# Patient Record
Sex: MALE | Race: WHITE | NOT HISPANIC OR LATINO | URBAN - METROPOLITAN AREA
[De-identification: names, ages, dates, MRNs, and addresses within clinical notes are randomized per-mention and may not be internally consistent; named-entity substitution may affect disease eponyms.]

---

## 2023-12-21 ENCOUNTER — OFFICE VISIT (OUTPATIENT)
Dept: PAIN MEDICINE | Facility: CLINIC | Age: 58
End: 2023-12-21
Payer: COMMERCIAL

## 2023-12-21 VITALS
SYSTOLIC BLOOD PRESSURE: 195 MMHG | TEMPERATURE: 97.3 F | HEART RATE: 78 BPM | DIASTOLIC BLOOD PRESSURE: 89 MMHG | WEIGHT: 183 LBS | RESPIRATION RATE: 18 BRPM | BODY MASS INDEX: 30.49 KG/M2 | HEIGHT: 65 IN

## 2023-12-21 DIAGNOSIS — M79.642 PAIN OF LEFT HAND: Primary | ICD-10-CM

## 2023-12-21 DIAGNOSIS — I10 PRIMARY HYPERTENSION: ICD-10-CM

## 2023-12-21 PROCEDURE — 99203 OFFICE O/P NEW LOW 30 MIN: CPT | Performed by: ANESTHESIOLOGY

## 2023-12-21 PROCEDURE — 3079F DIAST BP 80-89 MM HG: CPT | Performed by: ANESTHESIOLOGY

## 2023-12-21 PROCEDURE — 99213 OFFICE O/P EST LOW 20 MIN: CPT | Performed by: ANESTHESIOLOGY

## 2023-12-21 PROCEDURE — 3077F SYST BP >= 140 MM HG: CPT | Performed by: ANESTHESIOLOGY

## 2023-12-21 RX ORDER — TOPIRAMATE 25 MG/1
TABLET ORAL
Qty: 141 TABLET | Refills: 0 | Status: SHIPPED | OUTPATIENT
Start: 2023-12-21 | End: 2024-02-03

## 2023-12-21 RX ORDER — DULOXETIN HYDROCHLORIDE 30 MG/1
60 CAPSULE, DELAYED RELEASE ORAL DAILY
COMMUNITY

## 2023-12-21 RX ORDER — LISINOPRIL 5 MG/1
5 TABLET ORAL DAILY
Qty: 30 TABLET | Refills: 0 | Status: SHIPPED | OUTPATIENT
Start: 2023-12-21 | End: 2024-01-20

## 2023-12-21 ASSESSMENT — COLUMBIA-SUICIDE SEVERITY RATING SCALE - C-SSRS
6. HAVE YOU EVER DONE ANYTHING, STARTED TO DO ANYTHING, OR PREPARED TO DO ANYTHING TO END YOUR LIFE?: NO
2. HAVE YOU ACTUALLY HAD ANY THOUGHTS OF KILLING YOURSELF?: NO
1. IN THE PAST MONTH, HAVE YOU WISHED YOU WERE DEAD OR WISHED YOU COULD GO TO SLEEP AND NOT WAKE UP?: NO

## 2023-12-21 ASSESSMENT — PAIN SCALES - GENERAL
PAINLEVEL_OUTOF10: 5 - MODERATE PAIN
PAINLEVEL: 5

## 2023-12-21 ASSESSMENT — ENCOUNTER SYMPTOMS
NERVOUS/ANXIOUS: 1
BACK PAIN: 0
NUMBNESS: 1
EYE PAIN: 0
WEAKNESS: 0
SHORTNESS OF BREATH: 0
DEPRESSION: 0
DIFFICULTY URINATING: 0
ABDOMINAL PAIN: 0
ADENOPATHY: 0
LOSS OF SENSATION IN FEET: 0
FEVER: 0
OCCASIONAL FEELINGS OF UNSTEADINESS: 0

## 2023-12-21 ASSESSMENT — PATIENT HEALTH QUESTIONNAIRE - PHQ9
2. FEELING DOWN, DEPRESSED OR HOPELESS: NOT AT ALL
SUM OF ALL RESPONSES TO PHQ9 QUESTIONS 1 AND 2: 0
1. LITTLE INTEREST OR PLEASURE IN DOING THINGS: NOT AT ALL

## 2023-12-21 ASSESSMENT — PAIN DESCRIPTION - DESCRIPTORS: DESCRIPTORS: SPASM;DISCOMFORT

## 2023-12-21 ASSESSMENT — PAIN - FUNCTIONAL ASSESSMENT: PAIN_FUNCTIONAL_ASSESSMENT: 0-10

## 2023-12-21 NOTE — PROGRESS NOTES
Chief Complain    New Patient Visit (For pain in left palm, that has been going on for 3 years, after surgery. Hand was crushed and had to get surgery. Deny neck and back surgery.No I'm,ages of hand. Currently taking cymbalta and its not helping and get anxiety. )    History Of Present Illness  Rick Mcgregor is a 58 y.o. male here for evaluation of left hand pain. The patient has been experiencing these symptoms for last 3 year(s). The patient describes the pain as aching, pounding. The patient's current pain score is 5 on a scale from 0-10. The pain is worsened by  cold weather and using his hand  and is alleviated by heat. Since the start of the symptoms the pain has been worse.    The patient denies any fever, chills, weight loss, weakness, bladder/ bowel incontinence, history of cancer, history of IV drug abuse, recent trauma.      Past Medical History  He has no past medical history on file.    Surgical History  He has no past surgical history on file.    Social History  He reports that he has been smoking cigarettes. He started smoking about 30 years ago. He has never used smokeless tobacco. He reports current alcohol use of about 2.0 standard drinks of alcohol per week. No history on file for drug use.    Family History  No family history on file.     Allergies  Escitalopram    Review of Systems  Review of Systems   Constitutional:  Negative for fever.   HENT:  Negative for ear pain.    Eyes:  Negative for pain.   Respiratory:  Negative for shortness of breath.    Cardiovascular:  Negative for chest pain.   Gastrointestinal:  Negative for abdominal pain.   Endocrine: Positive for cold intolerance.   Genitourinary:  Negative for difficulty urinating.   Musculoskeletal:  Negative for back pain.   Skin:  Negative for rash.   Allergic/Immunologic: Negative for food allergies.   Neurological:  Positive for numbness. Negative for weakness.   Hematological:  Negative for adenopathy.   Psychiatric/Behavioral:   "Negative for suicidal ideas. The patient is nervous/anxious.         Physical Exam  Physical Exam  HENT:      Head: Normocephalic and atraumatic.   Eyes:      Extraocular Movements: Extraocular movements intact.   Cardiovascular:      Rate and Rhythm: Normal rate.   Pulmonary:      Effort: Pulmonary effort is normal.   Abdominal:      Palpations: Abdomen is soft.   Musculoskeletal:      Cervical back: Neck supple.   Skin:     General: Skin is warm.   Neurological:      General: No focal deficit present.      Mental Status: He is alert and oriented to person, place, and time.   Psychiatric:         Mood and Affect: Mood normal.         Behavior: Behavior normal.           Last Recorded Vitals  Blood pressure (!) 195/89, pulse 78, temperature 36.3 °C (97.3 °F), resp. rate 18, height 1.651 m (5' 5\"), weight 83 kg (183 lb).      Reviewed Labs   Latest Reference Range & Units 11/07/20 07:00   GLUCOSE 74 - 99 mg/dL 130 (H)   SODIUM 136 - 145 mmol/L 137   POTASSIUM 3.5 - 5.3 mmol/L 4.4   CHLORIDE 98 - 107 mmol/L 104   Bicarbonate 21 - 32 mmol/L 25   Anion Gap 10 - 20 mmol/L 12   Blood Urea Nitrogen 6 - 23 mg/dL 14   Creatinine 0.50 - 1.30 mg/dL 0.98   Calcium 8.6 - 10.6 mg/dL 8.7   (H): Data is abnormally high     Assessment/Plan     Rick Mcgregor is a 58 y.o. male here for evaluation of pain in the left hand.  He has been experiencing the symptoms since a crush injury he had in his hand 3 years ago.  He is status post surgical repair as well as skin grafting.  Continues to have significant throbbing and pounding pain in that region.  He has previously been tried on Cymbalta, gabapentin which made him more anxious.  He does have a follow-up appointment with hand specialist Dr. Meredith for further evaluation of this.  Meanwhile I will try him on Topamax for potential neuropathic pain, today his blood pressure was elevated otherwise however he was asymptomatic.  At home his blood sugar stays at 160 systolic.  Currently does " not have a primary care provider, strongly recommended establishing care with a primary care provider.  He was previously on lisinopril for blood pressure management however he stopped a year ago due to noncompliance.  I did put him back on 5 mg of lisinopril, ordered some blood work.  Rest of the management of workup per primary care provider.    Carlos Braxton MD

## 2024-01-04 ENCOUNTER — OFFICE VISIT (OUTPATIENT)
Dept: ORTHOPEDIC SURGERY | Facility: CLINIC | Age: 59
End: 2024-01-04
Payer: COMMERCIAL

## 2024-01-04 VITALS — HEIGHT: 65 IN | BODY MASS INDEX: 30.82 KG/M2 | WEIGHT: 185 LBS

## 2024-01-04 DIAGNOSIS — S61.402S: Primary | ICD-10-CM

## 2024-01-04 PROCEDURE — 99213 OFFICE O/P EST LOW 20 MIN: CPT | Performed by: ORTHOPAEDIC SURGERY

## 2024-01-04 PROCEDURE — G0463 HOSPITAL OUTPT CLINIC VISIT: HCPCS | Performed by: ORTHOPAEDIC SURGERY

## 2024-01-04 ASSESSMENT — PAIN - FUNCTIONAL ASSESSMENT: PAIN_FUNCTIONAL_ASSESSMENT: 0-10

## 2024-01-04 ASSESSMENT — PAIN SCALES - GENERAL: PAINLEVEL_OUTOF10: 6

## 2024-01-04 ASSESSMENT — PAIN DESCRIPTION - DESCRIPTORS: DESCRIPTORS: ACHING

## 2024-01-04 NOTE — LETTER
January 4, 2024     Wade Harrison MD  [address]  [facility] [address]    Patient: Rick Mcgregor   YOB: 1965   Date of Visit: 1/4/2024       Dear Dr. Wade Harrison MD:    Thank you for referring Rick Mcgregor to me for evaluation. Below are my notes for this consultation.  If you have questions, please do not hesitate to call me. I look forward to following your patient along with you.       Sincerely,     Jose Meredith MD      CC: Wade Coats MD  ______________________________________________________________________________________    Patient returns today for a long-term follow-up of a crush injury to his hand with palmar degloving requiring full thickness skin grafting. His symptoms are essentially unchanged. He has occasional sharp pain. He can feel the pulsations of his ulnar artery underneath his skin graft. He has vasospastic issues which are more exaggerated in the cold weather. 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 Coats. He is here for a final exam before he closes his claim and takes a settlement.     Past medical history, medications, allergies, surgical history and review of systems are reviewed and otherwise unchanged when compared to last visit on 1/27/2022.    Physical Examination Findings:   Constitutional: Oriented to person, place, and time. Appears well-developed and well-nourished.   Head: Normocephalic and atraumatic.   Eyes: Pupils are equal, round, and reactive to light.   Cardiovascular: Intact distal pulses.   Pulmonary/Chest/Breast: Effort normal. No respiratory distress.   Neurological: Alert and oriented to person, place, and time.   Skin: Skin is warm and dry.   Psychiatric: Normal mood and affect. Behavior is normal.   Musculoskeletal: Right hand examination reveals a very well healed skin graft to the hypothenar  eminence of his left hand. He has mild diffuse degenerative changes of the fingers. Excellent finger range of motion but he cannot quite make a tight fist. His ulnar artery is palpable underneath his skin graft. Fingers are well perfused. Sensation is intact to light touch.     Impression: Sequela of a degloving injury, left hand    Plan: Overall, he has done very well. His hand will not likely get any better than it is right now but I do not anticipate any significant worsening. I have recommended that he continue to be protective of his hand, particularly when using it for impact activities because he has lost the padding over his ulnar artery and using work gloves will provide some additional padding there. I am happy to see him again in the future if he has any further questions or concerns with his right hand.    Jose Meredith MD    [facility] [employer]  Department of Orthopaedic Surgery  Chief of Hand and Upper Extremity Surgery  [facility]    By signing my name below, I, Eugenia Mcintyre, attest that this documentation has been prepared under the direction and in the presence of Dr. Jose Meredith.   All medical record entries made by the Scribe were at my direction and personally dictated by me. I have reviewed the chart and agree that the record accurately reflects my personal performance of the history, physical exam, discussion and plan.

## 2024-01-04 NOTE — PROGRESS NOTES
Patient returns today for a long-term follow-up of a crush injury to his hand with palmar degloving requiring full thickness skin grafting. His symptoms are essentially unchanged. He has occasional sharp pain. He can feel the pulsations of his ulnar artery underneath his skin graft. He has vasospastic issues which are more exaggerated in the cold weather. 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 Coats. He is here for a final exam before he closes his claim and takes a settlement.     Past medical history, medications, allergies, surgical history and review of systems are reviewed and otherwise unchanged when compared to last visit on 1/27/2022.    Physical Examination Findings:   Constitutional: Oriented to person, place, and time. Appears well-developed and well-nourished.   Head: Normocephalic and atraumatic.   Eyes: Pupils are equal, round, and reactive to light.   Cardiovascular: Intact distal pulses.   Pulmonary/Chest/Breast: Effort normal. No respiratory distress.   Neurological: Alert and oriented to person, place, and time.   Skin: Skin is warm and dry.   Psychiatric: Normal mood and affect. Behavior is normal.   Musculoskeletal: Right hand examination reveals a very well healed skin graft to the hypothenar eminence of his left hand. He has mild diffuse degenerative changes of the fingers. Excellent finger range of motion but he cannot quite make a tight fist. His ulnar artery is palpable underneath his skin graft. Fingers are well perfused. Sensation is intact to light touch.     Impression: Sequela of a degloving injury, left hand    Plan: Overall, he has done very well. His hand will not likely get any better than it is right now but I do not anticipate any significant worsening. I have recommended that he continue to be protective of his hand, particularly when using it for impact activities because he has lost  the padding over his ulnar artery and using work gloves will provide some additional padding there. I am happy to see him again in the future if he has any further questions or concerns with his right hand.    Jose Meredith MD    [facility] [employer]  Department of Orthopaedic Surgery  Chief of Hand and Upper Extremity Surgery  [facility]    By signing my name below, I, Eugenia Mcintyre, attest that this documentation has been prepared under the direction and in the presence of Dr. Jose Meredith.   All medical record entries made by the Elysiaibe were at my direction and personally dictated by me. I have reviewed the chart and agree that the record accurately reflects my personal performance of the history, physical exam, discussion and plan.

## 2025-04-17 ENCOUNTER — HOSPITAL ENCOUNTER (OUTPATIENT)
Dept: NEUROLOGY | Facility: CLINIC | Age: 60
Discharge: HOME | End: 2025-04-17
Payer: COMMERCIAL

## 2025-04-17 DIAGNOSIS — S61.412A LACERATION WITHOUT FOREIGN BODY OF LEFT HAND, INITIAL ENCOUNTER: ICD-10-CM

## 2025-04-17 DIAGNOSIS — S67.22XA CRUSHING INJURY OF LEFT HAND, INITIAL ENCOUNTER: ICD-10-CM

## 2025-04-17 PROCEDURE — 95886 MUSC TEST DONE W/N TEST COMP: CPT | Mod: GC | Performed by: PSYCHIATRY & NEUROLOGY

## 2025-04-17 PROCEDURE — 95911 NRV CNDJ TEST 9-10 STUDIES: CPT | Mod: GC | Performed by: PSYCHIATRY & NEUROLOGY

## 2025-06-18 ENCOUNTER — APPOINTMENT (OUTPATIENT)
Dept: NEUROLOGY | Facility: HOSPITAL | Age: 60
End: 2025-06-18
Payer: COMMERCIAL